# Patient Record
Sex: FEMALE | Race: WHITE
[De-identification: names, ages, dates, MRNs, and addresses within clinical notes are randomized per-mention and may not be internally consistent; named-entity substitution may affect disease eponyms.]

---

## 2018-06-07 ENCOUNTER — HOSPITAL ENCOUNTER (EMERGENCY)
Dept: HOSPITAL 35 - ER | Age: 63
Discharge: HOME | End: 2018-06-07
Payer: COMMERCIAL

## 2018-06-07 VITALS — HEIGHT: 66 IN | WEIGHT: 250 LBS | BODY MASS INDEX: 40.18 KG/M2

## 2018-06-07 DIAGNOSIS — I10: ICD-10-CM

## 2018-06-07 DIAGNOSIS — Z87.891: ICD-10-CM

## 2018-06-07 DIAGNOSIS — E11.649: Primary | ICD-10-CM

## 2018-06-07 LAB
ANION GAP SERPL CALC-SCNC: 10 MMOL/L (ref 7–16)
BASOPHILS NFR BLD AUTO: 0.9 % (ref 0–2)
BUN SERPL-MCNC: 25 MG/DL (ref 7–18)
CALCIUM SERPL-MCNC: 9.4 MG/DL (ref 8.5–10.1)
CHLORIDE SERPL-SCNC: 107 MMOL/L (ref 98–107)
CO2 SERPL-SCNC: 23 MMOL/L (ref 21–32)
CREAT SERPL-MCNC: 1.5 MG/DL (ref 0.6–1)
EOSINOPHIL NFR BLD: 7.6 % (ref 0–3)
ERYTHROCYTE [DISTWIDTH] IN BLOOD BY AUTOMATED COUNT: 14.1 % (ref 10.5–14.5)
GLUCOSE SERPL-MCNC: 128 MG/DL (ref 74–106)
GRANULOCYTES NFR BLD MANUAL: 49.8 % (ref 36–66)
HCT VFR BLD CALC: 37.2 % (ref 37–47)
HGB BLD-MCNC: 12.5 GM/DL (ref 12–15)
LYMPHOCYTES NFR BLD AUTO: 31.2 % (ref 24–44)
MCH RBC QN AUTO: 27.5 PG (ref 26–34)
MCHC RBC AUTO-ENTMCNC: 33.4 G/DL (ref 28–37)
MCV RBC: 82.3 FL (ref 80–100)
MONOCYTES NFR BLD: 10.5 % (ref 1–8)
NEUTROPHILS # BLD: 2 THOU/UL (ref 1.4–8.2)
PLATELET # BLD: 146 THOU/UL (ref 150–400)
POTASSIUM SERPL-SCNC: 3.4 MMOL/L (ref 3.5–5.1)
RBC # BLD AUTO: 4.52 MIL/UL (ref 4.2–5)
SODIUM SERPL-SCNC: 140 MMOL/L (ref 136–145)
TROPONIN I SERPL-MCNC: < 0.04 NG/ML (ref ?–0.06)
WBC # BLD AUTO: 3.9 THOU/UL (ref 4–11)

## 2020-10-29 ENCOUNTER — HOSPITAL ENCOUNTER (INPATIENT)
Dept: HOSPITAL 35 - ICU | Age: 65
LOS: 3 days | Discharge: HOME | DRG: 637 | End: 2020-11-01
Attending: HOSPITALIST | Admitting: HOSPITALIST
Payer: COMMERCIAL

## 2020-10-29 ENCOUNTER — HOSPITAL ENCOUNTER (EMERGENCY)
Dept: HOSPITAL 96 - M.ERS | Age: 65
Discharge: ANOTHER HOSPITAL | End: 2020-10-29
Payer: MEDICARE

## 2020-10-29 VITALS — DIASTOLIC BLOOD PRESSURE: 55 MMHG | SYSTOLIC BLOOD PRESSURE: 100 MMHG

## 2020-10-29 VITALS — WEIGHT: 166.01 LBS | BODY MASS INDEX: 26.68 KG/M2 | HEIGHT: 66 IN

## 2020-10-29 VITALS — HEIGHT: 66 IN | WEIGHT: 182.2 LBS | BODY MASS INDEX: 29.28 KG/M2

## 2020-10-29 VITALS — SYSTOLIC BLOOD PRESSURE: 113 MMHG | DIASTOLIC BLOOD PRESSURE: 52 MMHG

## 2020-10-29 DIAGNOSIS — Z87.891: ICD-10-CM

## 2020-10-29 DIAGNOSIS — Z90.5: ICD-10-CM

## 2020-10-29 DIAGNOSIS — N18.9: ICD-10-CM

## 2020-10-29 DIAGNOSIS — E11.10: Primary | ICD-10-CM

## 2020-10-29 DIAGNOSIS — E11.22: ICD-10-CM

## 2020-10-29 DIAGNOSIS — Z98.890: ICD-10-CM

## 2020-10-29 DIAGNOSIS — I12.9: ICD-10-CM

## 2020-10-29 DIAGNOSIS — Z85.528: ICD-10-CM

## 2020-10-29 DIAGNOSIS — E87.6: ICD-10-CM

## 2020-10-29 DIAGNOSIS — Z98.891: ICD-10-CM

## 2020-10-29 DIAGNOSIS — I10: ICD-10-CM

## 2020-10-29 DIAGNOSIS — R53.1: ICD-10-CM

## 2020-10-29 DIAGNOSIS — N17.0: ICD-10-CM

## 2020-10-29 DIAGNOSIS — Z98.41: ICD-10-CM

## 2020-10-29 DIAGNOSIS — Z20.828: ICD-10-CM

## 2020-10-29 DIAGNOSIS — E43: ICD-10-CM

## 2020-10-29 DIAGNOSIS — Z98.42: ICD-10-CM

## 2020-10-29 DIAGNOSIS — Z91.14: ICD-10-CM

## 2020-10-29 LAB
ABSOLUTE BASOPHILS: 0 THOU/UL (ref 0–0.2)
ABSOLUTE EOSINOPHILS: 0 THOU/UL (ref 0–0.7)
ABSOLUTE MONOCYTES: 0.4 THOU/UL (ref 0–1.2)
ALBUMIN SERPL-MCNC: 3.6 G/DL (ref 3.4–5)
ALP SERPL-CCNC: 101 U/L (ref 46–116)
ALT SERPL-CCNC: 23 U/L (ref 30–65)
ANION GAP SERPL CALC-SCNC: 24 MMOL/L (ref 7–16)
AST SERPL-CCNC: 7 U/L (ref 15–37)
BASOPHILS NFR BLD AUTO: 0.7 %
BE: -13.6 MMOL/L
BILIRUB SERPL-MCNC: 0.9 MG/DL
BILIRUB UR-MCNC: NEGATIVE MG/DL
BUN SERPL-MCNC: 9 MG/DL (ref 7–18)
CALCIUM SERPL-MCNC: 9 MG/DL (ref 8.5–10.1)
CHLORIDE SERPL-SCNC: 95 MMOL/L (ref 98–107)
CO2 SERPL-SCNC: 14 MMOL/L (ref 21–32)
COLOR UR: YELLOW
CREAT SERPL-MCNC: 1.7 MG/DL (ref 0.6–1.3)
EOSINOPHIL NFR BLD: 1.1 %
GLUCOSE SERPL-MCNC: 538 MG/DL (ref 70–99)
GRANULOCYTES NFR BLD MANUAL: 74.3 %
HCT VFR BLD CALC: 38.7 % (ref 37–47)
HGB BLD-MCNC: 13 GM/DL (ref 12–15)
KETONES UR STRIP-MCNC: (no result) MG/DL
LYMPHOCYTES # BLD: 0.5 THOU/UL (ref 0.8–5.3)
LYMPHOCYTES NFR BLD AUTO: 12.5 %
MAGNESIUM SERPL-MCNC: 2.1 MG/DL (ref 1.8–2.4)
MCH RBC QN AUTO: 30.3 PG (ref 26–34)
MCHC RBC AUTO-ENTMCNC: 33.6 G/DL (ref 28–37)
MCV RBC: 90.3 FL (ref 80–100)
MONOCYTES NFR BLD: 11.4 %
MPV: 8.1 FL. (ref 7.2–11.1)
NEUTROPHILS # BLD: 2.7 THOU/UL (ref 1.6–8.1)
NUCLEATED RBCS: 0 /100WBC
PCO2 BLD: 26.5 MMHG (ref 35–45)
PLATELET COUNT*: 135 THOU/UL (ref 150–400)
PO2 BLD: 98.1 MMHG (ref 75–100)
POTASSIUM SERPL-SCNC: 3.6 MMOL/L (ref 3.5–5.1)
PROT SERPL-MCNC: 6.9 G/DL (ref 6.4–8.2)
PROT UR QL STRIP: (no result)
RBC # BLD AUTO: 4.28 MIL/UL (ref 4.2–5)
RBC # UR STRIP: NEGATIVE /UL
RDW-CV: 16.9 % (ref 10.5–14.5)
SODIUM SERPL-SCNC: 133 MMOL/L (ref 136–145)
SP GR UR STRIP: 1.02 (ref 1–1.03)
URINE CLARITY: CLEAR
URINE GLUCOSE-RANDOM: NEGATIVE
URINE LEUKOCYTES-REFLEX: NEGATIVE
URINE NITRITE-REFLEX: NEGATIVE
UROBILINOGEN UR STRIP-ACNC: 0.2 E.U./DL (ref 0.2–1)
WBC # BLD AUTO: 3.6 THOU/UL (ref 4–11)

## 2020-10-29 PROCEDURE — 10204: CPT

## 2020-10-29 PROCEDURE — 10047: CPT

## 2020-10-30 VITALS — SYSTOLIC BLOOD PRESSURE: 118 MMHG | DIASTOLIC BLOOD PRESSURE: 59 MMHG

## 2020-10-30 VITALS — DIASTOLIC BLOOD PRESSURE: 52 MMHG | SYSTOLIC BLOOD PRESSURE: 97 MMHG

## 2020-10-30 VITALS — SYSTOLIC BLOOD PRESSURE: 94 MMHG | DIASTOLIC BLOOD PRESSURE: 57 MMHG

## 2020-10-30 VITALS — DIASTOLIC BLOOD PRESSURE: 51 MMHG | SYSTOLIC BLOOD PRESSURE: 99 MMHG

## 2020-10-30 VITALS — SYSTOLIC BLOOD PRESSURE: 95 MMHG | DIASTOLIC BLOOD PRESSURE: 53 MMHG

## 2020-10-30 VITALS — DIASTOLIC BLOOD PRESSURE: 49 MMHG | SYSTOLIC BLOOD PRESSURE: 92 MMHG

## 2020-10-30 VITALS — SYSTOLIC BLOOD PRESSURE: 98 MMHG | DIASTOLIC BLOOD PRESSURE: 53 MMHG

## 2020-10-30 VITALS — DIASTOLIC BLOOD PRESSURE: 52 MMHG | SYSTOLIC BLOOD PRESSURE: 94 MMHG

## 2020-10-30 VITALS — DIASTOLIC BLOOD PRESSURE: 56 MMHG | SYSTOLIC BLOOD PRESSURE: 101 MMHG

## 2020-10-30 VITALS — SYSTOLIC BLOOD PRESSURE: 88 MMHG | DIASTOLIC BLOOD PRESSURE: 48 MMHG

## 2020-10-30 VITALS — SYSTOLIC BLOOD PRESSURE: 93 MMHG | DIASTOLIC BLOOD PRESSURE: 57 MMHG

## 2020-10-30 VITALS — DIASTOLIC BLOOD PRESSURE: 51 MMHG | SYSTOLIC BLOOD PRESSURE: 94 MMHG

## 2020-10-30 VITALS — SYSTOLIC BLOOD PRESSURE: 95 MMHG | DIASTOLIC BLOOD PRESSURE: 54 MMHG

## 2020-10-30 LAB
ALBUMIN SERPL-MCNC: 2.4 G/DL (ref 3.4–5)
ALBUMIN SERPL-MCNC: 2.7 G/DL (ref 3.4–5)
ALBUMIN SERPL-MCNC: 3 G/DL (ref 3.4–5)
ANION GAP SERPL CALC-SCNC: 11 MMOL/L (ref 7–16)
ANION GAP SERPL CALC-SCNC: 13 MMOL/L (ref 7–16)
ANION GAP SERPL CALC-SCNC: 19 MMOL/L (ref 7–16)
BUN SERPL-MCNC: 6 MG/DL (ref 7–18)
BUN SERPL-MCNC: 7 MG/DL (ref 7–18)
BUN SERPL-MCNC: 8 MG/DL (ref 7–18)
CALCIUM SERPL-MCNC: 8.2 MG/DL (ref 8.5–10.1)
CALCIUM SERPL-MCNC: 8.6 MG/DL (ref 8.5–10.1)
CALCIUM SERPL-MCNC: 8.6 MG/DL (ref 8.5–10.1)
CHLORIDE SERPL-SCNC: 100 MMOL/L (ref 98–107)
CHLORIDE SERPL-SCNC: 104 MMOL/L (ref 98–107)
CHLORIDE SERPL-SCNC: 105 MMOL/L (ref 98–107)
CO2 SERPL-SCNC: 17 MMOL/L (ref 21–32)
CO2 SERPL-SCNC: 20 MMOL/L (ref 21–32)
CO2 SERPL-SCNC: 21 MMOL/L (ref 21–32)
CREAT SERPL-MCNC: 1.3 MG/DL (ref 0.6–1)
CREAT SERPL-MCNC: 1.4 MG/DL (ref 0.6–1)
CREAT SERPL-MCNC: 1.6 MG/DL (ref 0.6–1)
GLUCOSE SERPL-MCNC: 120 MG/DL (ref 74–106)
GLUCOSE SERPL-MCNC: 237 MG/DL (ref 74–106)
GLUCOSE SERPL-MCNC: 298 MG/DL (ref 74–106)
MAGNESIUM SERPL-MCNC: 1.8 MG/DL (ref 1.8–2.4)
PHOSPHATE SERPL-MCNC: 1.5 MG/DL (ref 2.5–4.9)
PHOSPHATE SERPL-MCNC: 1.5 MG/DL (ref 2.5–4.9)
PHOSPHATE SERPL-MCNC: 2 MG/DL (ref 2.5–4.9)
POTASSIUM SERPL-SCNC: 3.1 MMOL/L (ref 3.5–5.1)
POTASSIUM SERPL-SCNC: 3.4 MMOL/L (ref 3.5–5.1)
POTASSIUM SERPL-SCNC: 4.2 MMOL/L (ref 3.5–5.1)
SODIUM SERPL-SCNC: 136 MMOL/L (ref 136–145)
SODIUM SERPL-SCNC: 136 MMOL/L (ref 136–145)
SODIUM SERPL-SCNC: 138 MMOL/L (ref 136–145)

## 2020-10-30 NOTE — NUR
Pt transfered to floor from icu per at 1700 i stable condition.
Assessment completed.vss. blood sugar was 338. insulin given as ordered and
dinner tray gien.No verbal c/o.Will continue to monitor.

## 2020-10-30 NOTE — EKG
[place] [address]
Phone:  [phone]                     ELECTROCARDIOGRAM REPORT      
_______________________________________________________________________________
 
Name:         JANET HUANG          Room:                     [facility]#:    [id number]     Account #:     [id number]  
Admission:    10/29/20    Attend Phys:                     
Discharge:    10/29/20    Date of Birth: [birth year]  
Date of Service: 10/29/20 1851  Report #:      [id number]
        [id number]
_______________________________________________________________________________
THIS REPORT FOR:  //name//                      
 
                         [facility] ED
                                       
Test Date:    2020-10-29               Test Time:    18:51:06
Pat Name:     JANET HUANG     Department:   
Patient ID:   [id number]            Room:          
Gender:       F                        Technician:   ANNI
:          [id number]               Requested By: Josef Salcedo
Order Number: [id number] MD:   Fermin Gotti
                                 Measurements
Intervals                              Axis          
Rate:         69                       P:            69
MT:           150                      QRS:          72
QRSD:         82                       T:            62
QT:           403                                    
QTc:          432                                    
                           Interpretive Statements
Sinus rhythm
Compared to ECG 2014 01:25:14
No significant changes
Electronically Signed On 10- 9:48:45 CDT by Fermin Gotti
[email or web address]
 
 
 
 
 
 
 
 
 
 
 
 
 
 
 
 
 
 
 
 
 
  <ELECTRONICALLY SIGNED>
                                           By: Fermin Gotti MD, [facility]     
  10/30/20     0948
D: 10/29/20 1851   _____________________________________
T: 10/29/20 1851   Fermin Gotti MD, [facility]       /EPI

## 2020-10-30 NOTE — NUR
chart review. cm was able to visit with zulema via phone call. into cm and
transition of care. she reported lives alone in mobile home. independent, no
dme, manage own medication and drives a vehicle. been out of insulin rt can
not afford it per pt. education on calling her pcp when out of meds and can
not afford them or going [facility]. per cm
supervisor ok to vouch for her insulin. zulema reported that when dc her
sister will be able to pick her up. when insulin ready from outpt pharm will
give to bedside nurse to lock up for dc. per hospitalist anticipate dc home on
saturday.

## 2020-10-30 NOTE — NUR
PATIENT ARRIVED AT THE UNIT AT APPROX 0000. PATIENT IS A/OX4. DENIES SOA, N/V,
PAIN. AFEBRILE. ATTACHED TO THE MONITOR. INSILIN GTT STARTED PER ORDER.
POTASSIUM AND MAG REPLACED AS ORDERED. REMAINS ON RA. DENIES NEEDS.
ENDOCRINOLOGY ANSWERING SERVICES CONTACTED. ENDOCRINOLOGIST NOT ON CALL OR
AVAILABLE. DAY SHIFT RN WILL CALL HOSPITALIST. REPORT GIVEN TO ONCOMING RN.

## 2020-10-31 VITALS — DIASTOLIC BLOOD PRESSURE: 60 MMHG | SYSTOLIC BLOOD PRESSURE: 101 MMHG

## 2020-10-31 VITALS — SYSTOLIC BLOOD PRESSURE: 113 MMHG | DIASTOLIC BLOOD PRESSURE: 65 MMHG

## 2020-10-31 VITALS — DIASTOLIC BLOOD PRESSURE: 52 MMHG | SYSTOLIC BLOOD PRESSURE: 101 MMHG

## 2020-10-31 LAB
ALBUMIN SERPL-MCNC: 2.5 G/DL (ref 3.4–5)
ALT SERPL-CCNC: 17 U/L (ref 30–65)
ANION GAP SERPL CALC-SCNC: 11 MMOL/L (ref 7–16)
AST SERPL-CCNC: 17 U/L (ref 15–37)
BASOPHILS NFR BLD AUTO: 0 % (ref 0–2)
BILIRUB SERPL-MCNC: 0.6 MG/DL (ref 0.2–1)
BUN SERPL-MCNC: 10 MG/DL (ref 7–18)
CALCIUM SERPL-MCNC: 8.2 MG/DL (ref 8.5–10.1)
CHLORIDE SERPL-SCNC: 103 MMOL/L (ref 98–107)
CO2 SERPL-SCNC: 21 MMOL/L (ref 21–32)
CREAT SERPL-MCNC: 1.5 MG/DL (ref 0.6–1)
EOSINOPHIL NFR BLD: 6 % (ref 0–3)
ERYTHROCYTE [DISTWIDTH] IN BLOOD BY AUTOMATED COUNT: 16.9 % (ref 10.5–14.5)
EST. AVERAGE GLUCOSE BLD GHB EST-MCNC: 352 MG/DL
EST. AVERAGE GLUCOSE BLD GHB EST-MCNC: 361 MG/DL
GLUCOSE SERPL-MCNC: 310 MG/DL (ref 74–106)
GLYCOHEMOGLOBIN (HGB A1C): 13.9 % (ref 4.8–5.6)
GLYCOHEMOGLOBIN (HGB A1C): 14.2 % (ref 4.8–5.6)
GRANULOCYTES NFR BLD MANUAL: 60 % (ref 36–66)
HCT VFR BLD CALC: 31.3 % (ref 37–47)
HGB BLD-MCNC: 10.8 GM/DL (ref 12–15)
LYMPHOCYTES NFR BLD AUTO: 10 % (ref 24–44)
MCH RBC QN AUTO: 31 PG (ref 26–34)
MCHC RBC AUTO-ENTMCNC: 34.5 G/DL (ref 28–37)
MCV RBC: 90.1 FL (ref 80–100)
MONOCYTES NFR BLD: 18 % (ref 1–8)
NEUTROPHILS # BLD: 2 THOU/UL (ref 1.4–8.2)
NEUTS BAND NFR BLD: 6 % (ref 0–8)
PLATELET # BLD EST: NORMAL 10*3/UL
PLATELET # BLD: 100 THOU/UL (ref 150–400)
POTASSIUM SERPL-SCNC: 4.5 MMOL/L (ref 3.5–5.1)
PROT SERPL-MCNC: 4.9 G/DL (ref 6.4–8.2)
RBC # BLD AUTO: 3.48 MIL/UL (ref 4.2–5)
RBC MORPH BLD: NORMAL
SODIUM SERPL-SCNC: 135 MMOL/L (ref 136–145)
WBC # BLD AUTO: 3.1 THOU/UL (ref 4–11)

## 2020-10-31 NOTE — NUR
Received awake on bed. Due medications given as prescribed, able to swallow
meds w/o difficulty. On room air. Vital signs stable. On MS, not on telemetry;
no complains and signs of chest pain, crushing sensation and heaviness. On
carb controlled diet, tolerating well; no nausea, no vomiting and no abdominal
pain noted. On blood sugar monitoring, taken and recorded accordingly, with
sliding scale insulin ordered, given as prescribed. Wuth SL at R AC- intact
and flushign well. Falls bundle in place. Continent of bowel and bladder, able
to use gait belt, walker and minimum to standby assist.
Pt complained of constipation- due scheduled dose of colace given as
prescribed.
ED called, pt's relative called to bring pt's stuff; no designated visitor
on the system, pt asked if she is agreeable to put Beverly Hassan as
designated visitor, informed her that no changes can be made afterwards; pt
agreed.
To continue monitoring patient.

## 2020-10-31 NOTE — NUR
Assumed care of patient this pm shift. Patient alert and orient x4. Patient
takes medications whole. Vital signs stable. Patient continent of bowel and
bladder. Falls precautions in place. Patient states that she would like a
stool softener. RN spoke to Practioner and Colace was ordered bid. Patient
shows no signs of acute distress. Patient sleeping off and on through the
night. We will continue to monitor per hospital policy.

## 2020-11-01 VITALS — DIASTOLIC BLOOD PRESSURE: 61 MMHG | SYSTOLIC BLOOD PRESSURE: 99 MMHG

## 2020-11-01 NOTE — NUR
PATIENT ALERT AND ORIENTED X4. UP WITH WALKER AROUND THE UNIT X2, WITHOUT
ASSIST. BS MONITORED PER ORDER. PLEASANT AND COOPERATIVE. WILL MONITOR.

## 2020-11-01 NOTE — NUR
ASSUMED PT CARE THIS AM. PT VSS. PT TO DISCHARGE HOME TODAY. BLOOD SUGAR TAKEN
CARE OF WITH INSULIN PER EMAR. PT COMPLAINS OF NO PAIN. ABLE TO REPOSITION
SELF. PT STATES THAT SHE WANTS A BATH AND THEN CAN BE DISCHARGED.